# Patient Record
(demographics unavailable — no encounter records)

---

## 2024-10-29 NOTE — HISTORY OF PRESENT ILLNESS
[Eczematous rashes] : eczematous rashes [Venom Reactions] : venom reactions [Drug Allergies] : drug allergies [de-identified] : TRAVIS MCGOWAN  is a 12 year old female who has a history of asthma and food allergies, mom says school called her and gave her forms to fill out so she could have an asthma pump on school grounds, teachers noticed she would wheeze when she would run around during gym class, she would drink water and stop running, mom says overall her asthma is under control unless she is participating in any physical activities. Mom says when she was 7 years old, she was inside her house and was cooking seafood, she instantly broke out in hives all over her face and body, mom took her to the ER where she was given Benadryl, she was prescribed an EpiPen and was told she could not be around seafood while it is being cooked, mom says she never had to use the EpiPen, she had blood work done in the past but mom does not remember the results, mom does not recall her experiencing any other allergic reactions in the past. 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 When mom cooks shrimp in the house she goes to her room and puts a mask, she says she can be around people who eat but she has never eaten it since initial reaction. She has seasonal allergies; in May she experiences itchy eyes. She is here today for a second opinion on her allergies.

## 2024-10-29 NOTE — ASSESSMENT
[FreeTextEntry1] : 1. AR - fluticasone nasal, cetirizine 10mg prn   2. FA - avoid shrimp - epipen  3. AS- albuterol prn and continue follow up with PCP, if worsening would consider pulmonary evaluation as well.

## 2024-10-29 NOTE — SOCIAL HISTORY
[Apartment] : [unfilled] lives in an apartment  [Radiator/Baseboard] : heating provided by radiator(s)/baseboard(s) [Window Units] : air conditioning provided by window units [Dry] : dry [Dog] : dog [Smokers in Household] : there are smokers in the home [Humidifier] : does not use a humidifier [Dehumidifier] : does not use a dehumidifier [Cockroaches] : Patient states that there are no cockroaches in the home [Dust Mite Covers] : does not have dust mite covers [Feather Pillows] : does not have feather pillows [Feather Comforter] : does not have a feather comforter [Bedroom] : not in the bedroom [Basement] : not in the basement [Living Area] : not in the living area [de-identified] : n/a

## 2025-07-15 NOTE — HISTORY OF PRESENT ILLNESS
[de-identified] : TRAVIS MCGOWAN is a 12 year old female who has a history of asthma and food allergies, mom says school called her and gave her forms to fill out so she could have an asthma pump on school grounds, teachers noticed she would wheeze when she would run around during gym class, she would drink water and stop running, mom says overall her asthma is under control unless she is participating in any physical activities. Mom says when she was 7 years old, she was inside her house and was cooking seafood, she instantly broke out in hives all over her face and body, mom took her to the ER where she was given Benadryl, she was prescribed an EpiPen and was told she could not be around seafood while it is being cooked, mom says she never had to use the EpiPen, she had blood work done in the past but mom does not remember the results, mom does not recall her experiencing any other allergic reactions in the past. 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 When mom cooks shrimp in the house she goes to her room and puts a mask, she says she can be around people who eat but she has never eaten it since initial reaction. She has seasonal allergies; in May she experiences itchy eyes. She is here today for a second opinion on her allergies.   No history or symptoms of eczematous rashes, venom reactions, drug allergies.  Interval:  She is here today for a follow up and medication refill, mom states she is doing well no new changes or worsening signs or symptoms.  Asthma: Mom reports since last visit, PCP has prescribed flovent 2 puffs BID to use intermittently for any flare ups.

## 2025-07-15 NOTE — ASSESSMENT
[FreeTextEntry1] : 1. AR - c/w fluticasone nasal, cetirizine 10mg prn -ordered labs.   2. FA - avoid shellfish. refilled epi device  3. asthma/EIB- albuterol prn and prior to exertion.  -c/w PCP follow up. Discussed if interested in us managing asthma to let us know and make an earlier follow up appt.   if worsening would consider pulmonary evaluation as well  RTC 1 yr